# Patient Record
Sex: FEMALE | Race: WHITE | NOT HISPANIC OR LATINO | Employment: UNEMPLOYED | URBAN - METROPOLITAN AREA
[De-identification: names, ages, dates, MRNs, and addresses within clinical notes are randomized per-mention and may not be internally consistent; named-entity substitution may affect disease eponyms.]

---

## 2019-01-13 ENCOUNTER — HOSPITAL ENCOUNTER (EMERGENCY)
Age: 24
Discharge: HOME | End: 2019-01-13
Attending: EMERGENCY MEDICINE

## 2019-01-13 VITALS
RESPIRATION RATE: 16 BRPM | WEIGHT: 136.5 LBS | HEART RATE: 84 BPM | TEMPERATURE: 96.9 F | SYSTOLIC BLOOD PRESSURE: 87 MMHG | OXYGEN SATURATION: 98 % | DIASTOLIC BLOOD PRESSURE: 57 MMHG

## 2019-01-13 DIAGNOSIS — F10.929 ALCOHOLIC INTOXICATION WITH COMPLICATION (CMD): Primary | ICD-10-CM

## 2019-01-13 LAB
AMPHETAMINES UR QL: NEGATIVE
ANION GAP BLD CALC-SCNC: 19 MMOL/L
B-HCG SPEC QL: <5 IU/L
BARBITURATES UR QL: NEGATIVE
BENZODIAZ UR QL: NEGATIVE
BUN BLD-MCNC: 7 MG/DL (ref 6–20)
BZE UR QL: NEGATIVE
CA-I BLD ISE-SCNC: 1.02 MMOL/L (ref 1.15–1.29)
CANNABINOIDS UR QL SCN: NEGATIVE
CHLORIDE BLD-SCNC: 101 MMOL/L (ref 98–107)
CO2 BLD-SCNC: 25 MMOL/L (ref 19–24)
CREAT BLD-MCNC: 1 MG/DL (ref 0.51–0.95)
CREAT BLD-MCNC: >90 MG/DL
ETHANOL SERPL-MCNC: 278 MG/DL
GLUCOSE BLD-MCNC: 89 MG/DL (ref 65–99)
HCT VFR BLD CALC: 37 % (ref 36–46.5)
HGB BLD-MCNC: 12.6 G/DL (ref 12–15.5)
OPIATES UR QL: NEGATIVE
PCP UR QL: NEGATIVE
POTASSIUM BLD-SCNC: 3.4 MMOL/L (ref 3.4–5.1)
SODIUM BLD-SCNC: 141 MMOL/L (ref 135–145)

## 2019-01-13 PROCEDURE — 80307 DRUG TEST PRSMV CHEM ANLYZR: CPT

## 2019-01-13 PROCEDURE — 99283 EMERGENCY DEPT VISIT LOW MDM: CPT | Performed by: EMERGENCY MEDICINE

## 2019-01-13 PROCEDURE — 84702 CHORIONIC GONADOTROPIN TEST: CPT

## 2019-01-13 PROCEDURE — 36415 COLL VENOUS BLD VENIPUNCTURE: CPT

## 2019-01-13 PROCEDURE — 80320 DRUG SCREEN QUANTALCOHOLS: CPT

## 2019-01-13 PROCEDURE — 80047 BASIC METABLC PNL IONIZED CA: CPT

## 2019-01-13 PROCEDURE — 10002807 HB RX 258

## 2019-01-13 PROCEDURE — 99284 EMERGENCY DEPT VISIT MOD MDM: CPT

## 2019-01-13 RX ORDER — SODIUM CHLORIDE 9 MG/ML
INJECTION, SOLUTION INTRAVENOUS
Status: COMPLETED
Start: 2019-01-13 | End: 2019-01-13

## 2019-01-13 RX ADMIN — SODIUM CHLORIDE 1000 ML: 9 INJECTION, SOLUTION INTRAVENOUS at 04:23

## 2020-07-20 VITALS
TEMPERATURE: 97 F | RESPIRATION RATE: 16 BRPM | WEIGHT: 120.81 LBS | DIASTOLIC BLOOD PRESSURE: 61 MMHG | HEIGHT: 65 IN | OXYGEN SATURATION: 98 % | HEART RATE: 53 BPM | SYSTOLIC BLOOD PRESSURE: 99 MMHG

## 2020-07-20 NOTE — PRE-OP CHECKLIST - SELECT TESTS ORDERED
BMP/CBC/PT/PTT/INR EKG/Covid negative, 7/21/PT/PTT/BMP/CBC/INR EKG/Covid negative, 7/21, urine HCG negative/PT/PTT/BMP/INR/CBC

## 2020-07-23 ENCOUNTER — INPATIENT (INPATIENT)
Facility: HOSPITAL | Age: 25
LOS: 1 days | Discharge: HOME | DRG: 132 | End: 2020-07-25
Attending: SURGERY | Admitting: SURGERY
Payer: COMMERCIAL

## 2020-07-23 DIAGNOSIS — Z98.890 OTHER SPECIFIED POSTPROCEDURAL STATES: Chronic | ICD-10-CM

## 2020-07-23 LAB
BASE EXCESS BLDA CALC-SCNC: -2.1 MMOL/L — LOW (ref -2–3)
CA-I BLDA-SCNC: 1.06 MMOL/L — LOW (ref 1.12–1.3)
COHGB MFR BLDA: 0.3 % — SIGNIFICANT CHANGE UP
GLUCOSE BLDC GLUCOMTR-MCNC: 154 MG/DL — HIGH (ref 70–99)
HCO3 BLDA-SCNC: 22 MMOL/L — SIGNIFICANT CHANGE UP (ref 21–28)
HCT VFR BLD CALC: 30.7 % — LOW (ref 34.5–45)
HGB BLD-MCNC: 10.4 G/DL — LOW (ref 11.5–15.5)
HGB BLDA-MCNC: 10.9 G/DL — LOW (ref 11.5–15.5)
MCHC RBC-ENTMCNC: 31.6 PG — SIGNIFICANT CHANGE UP (ref 27–34)
MCHC RBC-ENTMCNC: 33.9 GM/DL — SIGNIFICANT CHANGE UP (ref 32–36)
MCV RBC AUTO: 93.3 FL — SIGNIFICANT CHANGE UP (ref 80–100)
METHGB MFR BLDA: 0.7 % — SIGNIFICANT CHANGE UP
NRBC # BLD: 0 /100 WBCS — SIGNIFICANT CHANGE UP (ref 0–0)
O2 CT VFR BLDA CALC: 14.6 ML/DL — SIGNIFICANT CHANGE UP (ref 15–23)
OXYHGB MFR BLDA: 94 % — SIGNIFICANT CHANGE UP (ref 94–100)
PCO2 BLDA: 36 MMHG — SIGNIFICANT CHANGE UP (ref 32–45)
PH BLDA: 7.41 — SIGNIFICANT CHANGE UP (ref 7.35–7.45)
PLATELET # BLD AUTO: 154 K/UL — SIGNIFICANT CHANGE UP (ref 150–400)
PO2 BLDA: 84 MMHG — SIGNIFICANT CHANGE UP (ref 83–108)
POTASSIUM BLDA-SCNC: 3.8 MMOL/L — SIGNIFICANT CHANGE UP (ref 3.5–4.9)
RBC # BLD: 3.29 M/UL — LOW (ref 3.8–5.2)
RBC # FLD: 12.5 % — SIGNIFICANT CHANGE UP (ref 10.3–14.5)
SAO2 % BLDA: 95 % — SIGNIFICANT CHANGE UP (ref 95–100)
SODIUM BLDA-SCNC: 138 MMOL/L — SIGNIFICANT CHANGE UP (ref 138–146)
WBC # BLD: 12.84 K/UL — HIGH (ref 3.8–10.5)
WBC # FLD AUTO: 12.84 K/UL — HIGH (ref 3.8–10.5)

## 2020-07-23 RX ORDER — ACETAMINOPHEN 500 MG
1000 TABLET ORAL EVERY 6 HOURS
Refills: 0 | Status: DISCONTINUED | OUTPATIENT
Start: 2020-07-23 | End: 2020-07-25

## 2020-07-23 RX ORDER — HYDROMORPHONE HYDROCHLORIDE 2 MG/ML
0.5 INJECTION INTRAMUSCULAR; INTRAVENOUS; SUBCUTANEOUS EVERY 4 HOURS
Refills: 0 | Status: DISCONTINUED | OUTPATIENT
Start: 2020-07-23 | End: 2020-07-24

## 2020-07-23 RX ORDER — DEXAMETHASONE 0.5 MG/5ML
10 ELIXIR ORAL EVERY 8 HOURS
Refills: 0 | Status: DISCONTINUED | OUTPATIENT
Start: 2020-07-23 | End: 2020-07-25

## 2020-07-23 RX ORDER — ONDANSETRON 8 MG/1
4 TABLET, FILM COATED ORAL EVERY 6 HOURS
Refills: 0 | Status: DISCONTINUED | OUTPATIENT
Start: 2020-07-23 | End: 2020-07-25

## 2020-07-23 RX ORDER — FLUOXETINE HCL 10 MG
30 CAPSULE ORAL DAILY
Refills: 0 | Status: DISCONTINUED | OUTPATIENT
Start: 2020-07-23 | End: 2020-07-25

## 2020-07-23 RX ORDER — CHLORHEXIDINE GLUCONATE 213 G/1000ML
15 SOLUTION TOPICAL
Refills: 0 | Status: DISCONTINUED | OUTPATIENT
Start: 2020-07-23 | End: 2020-07-25

## 2020-07-23 RX ORDER — HEPARIN SODIUM 5000 [USP'U]/ML
5000 INJECTION INTRAVENOUS; SUBCUTANEOUS EVERY 8 HOURS
Refills: 0 | Status: DISCONTINUED | OUTPATIENT
Start: 2020-07-24 | End: 2020-07-25

## 2020-07-23 RX ORDER — OXYCODONE HYDROCHLORIDE 5 MG/1
5 TABLET ORAL EVERY 4 HOURS
Refills: 0 | Status: DISCONTINUED | OUTPATIENT
Start: 2020-07-23 | End: 2020-07-25

## 2020-07-23 RX ORDER — SODIUM CHLORIDE 9 MG/ML
1000 INJECTION, SOLUTION INTRAVENOUS
Refills: 0 | Status: DISCONTINUED | OUTPATIENT
Start: 2020-07-23 | End: 2020-07-25

## 2020-07-23 RX ADMIN — Medication 102 MILLIGRAM(S): at 22:34

## 2020-07-23 RX ADMIN — HYDROMORPHONE HYDROCHLORIDE 0.5 MILLIGRAM(S): 2 INJECTION INTRAMUSCULAR; INTRAVENOUS; SUBCUTANEOUS at 23:32

## 2020-07-23 RX ADMIN — SODIUM CHLORIDE 50 MILLILITER(S): 9 INJECTION, SOLUTION INTRAVENOUS at 21:01

## 2020-07-23 NOTE — H&P ADULT - HISTORY OF PRESENT ILLNESS
24F pmhx depression on fluoxetine, s/p septoplasty 5 years ago. admitted today for lefort 1 osteosomy, genioplasty, bilateral saggital split osteotomy, L otoplasty.    Hospital Course:  07-23-20 @ 20:56    Vital Signs Last 24 Hrs  T(C): 37.7 (23 Jul 2020 20:30), Max: 37.7 (23 Jul 2020 20:30)  T(F): 99.8 (23 Jul 2020 20:30), Max: 99.8 (23 Jul 2020 20:30)  HR: --  BP: 105/51 (23 Jul 2020 20:30) (105/51 - 105/51)  BP(mean): --  RR: 19 (23 Jul 2020 20:30) (19 - 19)  SpO2: 90% (23 Jul 2020 20:30) (90% - 90%)    MEDICATIONS  (STANDING):  acetaminophen    Suspension .. 1000 milliGRAM(s) Oral every 6 hours  chlorhexidine 0.12% Liquid 15 milliLiter(s) Oral Mucosa two times a day  dexAMETHasone  IVPB 10 milliGRAM(s) IV Intermittent every 8 hours  FLUoxetine Solution 30 milliGRAM(s) Oral daily  lactated ringers. 1000 milliLiter(s) (50 mL/Hr) IV Continuous <Continuous>      General: AAOx3, resting in bed, comfortable  C/V: NSR RRR  Pulm: Nonlabored breathing, no respiratory distress  Extrem: WWP, no edema, SCDs in place  HEENT: intraoral incision c/d/i, no bleeding, no hematoma. mild perifacial swelling. head bra in place with gauze fluffs. rubber bands in place      A/P: 24F pmhx depression on fluoxetine, s/p septoplasty 5 years ago. admitted today for lefort 1 osteosomy, genioplasty, bilateral saggital split osteotomy, L otoplasty.  - admit to tele  - DVT ppx  - CLD and red rubber feeding tomorrow (SLP)  - pain control  - anti emetics PRN  - head bra  - ice pack  - peridex  - decadron 10q8  - stat CBC and labs tomorrow    ----------------------------------------------  Ramón Timur, MD  Resident  Department of Otolaryngology - Head and Neck Surgery  [facility]

## 2020-07-24 LAB
ANION GAP SERPL CALC-SCNC: 10 MMOL/L — SIGNIFICANT CHANGE UP (ref 5–17)
BASOPHILS # BLD AUTO: 0.01 K/UL — SIGNIFICANT CHANGE UP (ref 0–0.2)
BASOPHILS NFR BLD AUTO: 0.1 % — SIGNIFICANT CHANGE UP (ref 0–2)
BUN SERPL-MCNC: 15 MG/DL — SIGNIFICANT CHANGE UP (ref 7–23)
CALCIUM SERPL-MCNC: 8.8 MG/DL — SIGNIFICANT CHANGE UP (ref 8.4–10.5)
CHLORIDE SERPL-SCNC: 105 MMOL/L — SIGNIFICANT CHANGE UP (ref 96–108)
CO2 SERPL-SCNC: 26 MMOL/L — SIGNIFICANT CHANGE UP (ref 22–31)
CREAT SERPL-MCNC: 0.74 MG/DL — SIGNIFICANT CHANGE UP (ref 0.5–1.3)
EOSINOPHIL # BLD AUTO: 0 K/UL — SIGNIFICANT CHANGE UP (ref 0–0.5)
EOSINOPHIL NFR BLD AUTO: 0 % — SIGNIFICANT CHANGE UP (ref 0–6)
GLUCOSE SERPL-MCNC: 138 MG/DL — HIGH (ref 70–99)
HCT VFR BLD CALC: 28.4 % — LOW (ref 34.5–45)
HGB BLD-MCNC: 9.5 G/DL — LOW (ref 11.5–15.5)
IMM GRANULOCYTES NFR BLD AUTO: 0.5 % — SIGNIFICANT CHANGE UP (ref 0–1.5)
LYMPHOCYTES # BLD AUTO: 0.49 K/UL — LOW (ref 1–3.3)
LYMPHOCYTES # BLD AUTO: 3.9 % — LOW (ref 13–44)
MAGNESIUM SERPL-MCNC: 2 MG/DL — SIGNIFICANT CHANGE UP (ref 1.6–2.6)
MCHC RBC-ENTMCNC: 31.5 PG — SIGNIFICANT CHANGE UP (ref 27–34)
MCHC RBC-ENTMCNC: 33.5 GM/DL — SIGNIFICANT CHANGE UP (ref 32–36)
MCV RBC AUTO: 94 FL — SIGNIFICANT CHANGE UP (ref 80–100)
MONOCYTES # BLD AUTO: 0.64 K/UL — SIGNIFICANT CHANGE UP (ref 0–0.9)
MONOCYTES NFR BLD AUTO: 5.1 % — SIGNIFICANT CHANGE UP (ref 2–14)
NEUTROPHILS # BLD AUTO: 11.4 K/UL — HIGH (ref 1.8–7.4)
NEUTROPHILS NFR BLD AUTO: 90.4 % — HIGH (ref 43–77)
NRBC # BLD: 0 /100 WBCS — SIGNIFICANT CHANGE UP (ref 0–0)
PHOSPHATE SERPL-MCNC: 3.9 MG/DL — SIGNIFICANT CHANGE UP (ref 2.5–4.5)
PLATELET # BLD AUTO: 125 K/UL — LOW (ref 150–400)
POTASSIUM SERPL-MCNC: 4.3 MMOL/L — SIGNIFICANT CHANGE UP (ref 3.5–5.3)
POTASSIUM SERPL-SCNC: 4.3 MMOL/L — SIGNIFICANT CHANGE UP (ref 3.5–5.3)
RBC # BLD: 3.02 M/UL — LOW (ref 3.8–5.2)
RBC # FLD: 12.6 % — SIGNIFICANT CHANGE UP (ref 10.3–14.5)
SODIUM SERPL-SCNC: 141 MMOL/L — SIGNIFICANT CHANGE UP (ref 135–145)
WBC # BLD: 12.6 K/UL — HIGH (ref 3.8–10.5)
WBC # FLD AUTO: 12.6 K/UL — HIGH (ref 3.8–10.5)

## 2020-07-24 RX ORDER — OXYCODONE HYDROCHLORIDE 5 MG/1
5 TABLET ORAL
Qty: 100 | Refills: 0
Start: 2020-07-24 | End: 2020-07-28

## 2020-07-24 RX ORDER — PETROLATUM,WHITE
1 JELLY (GRAM) TOPICAL THREE TIMES A DAY
Refills: 0 | Status: DISCONTINUED | OUTPATIENT
Start: 2020-07-24 | End: 2020-07-25

## 2020-07-24 RX ORDER — AMOXICILLIN 250 MG/5ML
10 SUSPENSION, RECONSTITUTED, ORAL (ML) ORAL
Qty: 210 | Refills: 0
Start: 2020-07-24 | End: 2020-07-30

## 2020-07-24 RX ORDER — SODIUM CHLORIDE 0.65 %
2 AEROSOL, SPRAY (ML) NASAL
Qty: 1 | Refills: 0
Start: 2020-07-24 | End: 2020-07-30

## 2020-07-24 RX ORDER — AMOXICILLIN 250 MG/5ML
20 SUSPENSION, RECONSTITUTED, ORAL (ML) ORAL
Qty: 420 | Refills: 0
Start: 2020-07-24 | End: 2020-07-30

## 2020-07-24 RX ORDER — GUANFACINE 3 MG/1
1 TABLET, EXTENDED RELEASE ORAL
Qty: 7 | Refills: 0
Start: 2020-07-24 | End: 2020-07-30

## 2020-07-24 RX ORDER — CHLORHEXIDINE GLUCONATE 213 G/1000ML
15 SOLUTION TOPICAL
Qty: 315 | Refills: 0
Start: 2020-07-24 | End: 2020-07-30

## 2020-07-24 RX ADMIN — CHLORHEXIDINE GLUCONATE 15 MILLILITER(S): 213 SOLUTION TOPICAL at 19:21

## 2020-07-24 RX ADMIN — Medication 1000 MILLIGRAM(S): at 05:56

## 2020-07-24 RX ADMIN — HEPARIN SODIUM 5000 UNIT(S): 5000 INJECTION INTRAVENOUS; SUBCUTANEOUS at 05:56

## 2020-07-24 RX ADMIN — HEPARIN SODIUM 5000 UNIT(S): 5000 INJECTION INTRAVENOUS; SUBCUTANEOUS at 15:49

## 2020-07-24 RX ADMIN — Medication 1 APPLICATION(S): at 15:51

## 2020-07-24 RX ADMIN — Medication 102 MILLIGRAM(S): at 15:49

## 2020-07-24 RX ADMIN — CHLORHEXIDINE GLUCONATE 15 MILLILITER(S): 213 SOLUTION TOPICAL at 05:56

## 2020-07-24 RX ADMIN — Medication 102 MILLIGRAM(S): at 22:30

## 2020-07-24 RX ADMIN — OXYCODONE HYDROCHLORIDE 5 MILLIGRAM(S): 5 TABLET ORAL at 21:13

## 2020-07-24 RX ADMIN — HYDROMORPHONE HYDROCHLORIDE 0.5 MILLIGRAM(S): 2 INJECTION INTRAMUSCULAR; INTRAVENOUS; SUBCUTANEOUS at 03:33

## 2020-07-24 RX ADMIN — HYDROMORPHONE HYDROCHLORIDE 0.5 MILLIGRAM(S): 2 INJECTION INTRAMUSCULAR; INTRAVENOUS; SUBCUTANEOUS at 14:36

## 2020-07-24 RX ADMIN — Medication 1 APPLICATION(S): at 22:30

## 2020-07-24 RX ADMIN — Medication 1000 MILLIGRAM(S): at 19:21

## 2020-07-24 RX ADMIN — HYDROMORPHONE HYDROCHLORIDE 0.5 MILLIGRAM(S): 2 INJECTION INTRAMUSCULAR; INTRAVENOUS; SUBCUTANEOUS at 10:23

## 2020-07-24 RX ADMIN — Medication 102 MILLIGRAM(S): at 06:12

## 2020-07-24 RX ADMIN — Medication 30 MILLIGRAM(S): at 12:04

## 2020-07-24 RX ADMIN — HEPARIN SODIUM 5000 UNIT(S): 5000 INJECTION INTRAVENOUS; SUBCUTANEOUS at 22:30

## 2020-07-24 RX ADMIN — HYDROMORPHONE HYDROCHLORIDE 0.5 MILLIGRAM(S): 2 INJECTION INTRAMUSCULAR; INTRAVENOUS; SUBCUTANEOUS at 00:30

## 2020-07-24 RX ADMIN — HYDROMORPHONE HYDROCHLORIDE 0.5 MILLIGRAM(S): 2 INJECTION INTRAMUSCULAR; INTRAVENOUS; SUBCUTANEOUS at 05:00

## 2020-07-24 RX ADMIN — SODIUM CHLORIDE 50 MILLILITER(S): 9 INJECTION, SOLUTION INTRAVENOUS at 05:56

## 2020-07-24 NOTE — SWALLOW BEDSIDE ASSESSMENT ADULT - ORAL PHASE
Reduced labial seal with mild anterior spillage.  Pt also had some difficulty with placement of the feeding device 2/2 reduced labial sensation.

## 2020-07-24 NOTE — PROGRESS NOTE ADULT - SUBJECTIVE AND OBJECTIVE BOX
24F pmhx depression on fluoxetine, s/p septoplasty 5 years ago. admitted today for lefort 1 osteosomy, genioplasty, bilateral saggital split osteotomy, L otoplasty.    07-24 Patient seen and discussed with attending.     ALLERGIES  No Known Drug Allergies  Peaches (Unknown)  Pears (Unknown)    MEDICATIONS  (STANDING):  acetaminophen    Suspension .. 1000 milliGRAM(s) Oral every 6 hours  chlorhexidine 0.12% Liquid 15 milliLiter(s) Oral Mucosa two times a day  dexAMETHasone  IVPB 10 milliGRAM(s) IV Intermittent every 8 hours  FLUoxetine Solution 30 milliGRAM(s) Oral daily  heparin   Injectable 5000 Unit(s) SubCutaneous every 8 hours  lactated ringers. 1000 milliLiter(s) (50 mL/Hr) IV Continuous <Continuous>  petrolatum white Ointment 1 Application(s) Topical three times a day    MEDICATIONS  (PRN):  HYDROmorphone  Injectable 0.5 milliGRAM(s) IV Push every 4 hours PRN Severe Pain (7 - 10)  ondansetron Injectable 4 milliGRAM(s) IV Push every 6 hours PRN Nausea  oxyCODONE    Solution 5 milliGRAM(s) Oral every 4 hours PRN Mild Pain (1 - 3)      LABS                          9.5    12.60 )-----------( 125      ( 24 Jul 2020 06:38 )             28.4     07-24    141  |  105  |  15  ----------------------------<  138<H>  4.3   |  26  |  0.74    Ca    8.8      24 Jul 2020 06:38  Phos  3.9     07-24  Mg     2.0     07-24            INs & OUTs    07-23-20 @ 07:01  -  07-24-20 @ 07:00  --------------------------------------------------------  IN: 250 mL / OUT: 800 mL / NET: -550 mL    07-24-20 @ 07:01  - 07-24-20 @ 17:31  --------------------------------------------------------  IN: 0 mL / OUT: 100 mL / NET: -100 mL        RADIOLOGY AND IMAGING: reviewed    VITAL SIGNS:  ICU Vital Signs Last 24 Hrs  T(C): 36.3 (24 Jul 2020 13:40), Max: 37.7 (23 Jul 2020 20:30)  T(F): 97.3 (24 Jul 2020 13:40), Max: 99.8 (23 Jul 2020 20:30)  HR: 68 (24 Jul 2020 15:59) (50 - 68)  BP: 97/55 (24 Jul 2020 15:59) (94/50 - 105/56)  BP(mean): 70 (24 Jul 2020 15:59) (67 - 74)  ABP: --  ABP(mean): --  RR: 18 (24 Jul 2020 15:59) (12 - 19)  SpO2: 96% (24 Jul 2020 15:59) (90% - 97%)    PHYSICAL EXAM:  AAOx3, resting in bed, comfortable  C/V: NSR RRR  Pulm: Nonlabored breathing, no respiratory distress  Extrem: WWP, no edema, SCDs in place  HEENT: intraoral incision c/d/i, no bleeding, no hematoma. mild perifacial swelling. head bra in place with gauze fluffs. rubber bands in place      A/P: 24F pmhx depression on fluoxetine, s/p septoplasty 5 years ago. admitted today for lefort 1 osteosomy, genioplasty, bilateral saggital split osteotomy, L otoplasty.  - admit to tele  - DVT ppx  - CLD and red rubber feeding tomorrow (SLP)  - pain control  - anti emetics PRN  - head bra 24F pmhx depression on fluoxetine, s/p septoplasty 5 years ago. admitted today for lefort 1 osteosomy, genioplasty, bilateral saggital split osteotomy, L otoplasty.    07-24 Patient seen and discussed with attending. patient was straight cathed at 4AM due to bladder retention but otherwise did well. She passed her TOV this afternoon. she is tolerating PO with the nutri bottle, will continue to monitor.     ALLERGIES  No Known Drug Allergies  Peaches (Unknown)  Pears (Unknown)    MEDICATIONS  (STANDING):  acetaminophen    Suspension .. 1000 milliGRAM(s) Oral every 6 hours  chlorhexidine 0.12% Liquid 15 milliLiter(s) Oral Mucosa two times a day  dexAMETHasone  IVPB 10 milliGRAM(s) IV Intermittent every 8 hours  FLUoxetine Solution 30 milliGRAM(s) Oral daily  heparin   Injectable 5000 Unit(s) SubCutaneous every 8 hours  lactated ringers. 1000 milliLiter(s) (50 mL/Hr) IV Continuous <Continuous>  petrolatum white Ointment 1 Application(s) Topical three times a day    MEDICATIONS  (PRN):  HYDROmorphone  Injectable 0.5 milliGRAM(s) IV Push every 4 hours PRN Severe Pain (7 - 10)  ondansetron Injectable 4 milliGRAM(s) IV Push every 6 hours PRN Nausea  oxyCODONE    Solution 5 milliGRAM(s) Oral every 4 hours PRN Mild Pain (1 - 3)      LABS                          9.5    12.60 )-----------( 125      ( 24 Jul 2020 06:38 )             28.4     07-24    141  |  105  |  15  ----------------------------<  138<H>  4.3   |  26  |  0.74    Ca    8.8      24 Jul 2020 06:38  Phos  3.9     07-24  Mg     2.0     07-24            INs & OUTs    07-23-20 @ 07:01  -  07-24-20 @ 07:00  --------------------------------------------------------  IN: 250 mL / OUT: 800 mL / NET: -550 mL    07-24-20 @ 07:01  -  07-24-20 @ 17:31  --------------------------------------------------------  IN: 0 mL / OUT: 100 mL / NET: -100 mL        RADIOLOGY AND IMAGING: reviewed    VITAL SIGNS:  ICU Vital Signs Last 24 Hrs  T(C): 36.3 (24 Jul 2020 13:40), Max: 37.7 (23 Jul 2020 20:30)  T(F): 97.3 (24 Jul 2020 13:40), Max: 99.8 (23 Jul 2020 20:30)  HR: 68 (24 Jul 2020 15:59) (50 - 68)  BP: 97/55 (24 Jul 2020 15:59) (94/50 - 105/56)  BP(mean): 70 (24 Jul 2020 15:59) (67 - 74)  ABP: --  ABP(mean): --  RR: 18 (24 Jul 2020 15:59) (12 - 19)  SpO2: 96% (24 Jul 2020 15:59) (90% - 97%)    PHYSICAL EXAM:  AAOx3, resting in bed, comfortable  C/V: NSR RRR  Pulm: Nonlabored breathing, no respiratory distress  Extrem: WWP, no edema, SCDs in place  HEENT: intraoral incision c/d/i, no bleeding, no hematoma. mild perifacial swelling. head bra in place with gauze fluffs. rubber bands in place      A/P: 24F pmhx depression on fluoxetine, s/p septoplasty 5 years ago. admitted today for lefort 1 osteosomy, genioplasty, bilateral saggital split osteotomy, L otoplasty.  - DVT ppx  - CLD and nutri-squeeze bottle, will monitor PO intake  - pain control  - anti emetics PRN  - head bra

## 2020-07-24 NOTE — SWALLOW BEDSIDE ASSESSMENT ADULT - SLP GENERAL OBSERVATIONS
Pt received awake and alert, pleasant, in NAD, but reporting some pain in the lower face and mouth region s/p surgery yesterday.  She reported that she was able to tolerate small amounts of water this AM using a syringe with a red catheter attachment.

## 2020-07-24 NOTE — SWALLOW BEDSIDE ASSESSMENT ADULT - SPECIFY REASON(S)
24 F s/p Lefort 1 osteosomy, genioplasty, b/l sagittal split osteotomy, L optoplasty 7/23/20, currently with mouth wired shut.

## 2020-07-24 NOTE — SWALLOW BEDSIDE ASSESSMENT ADULT - SWALLOW EVAL: DIAGNOSIS
Pt with primarily oral stage dysphagia 2/2 mouth being wired shut s/p Lefort 1 osteosomy & related procedures yesterday.  She was able to swallow thin liquids using a Nutri-Squeeze bottle without overt signs & symptoms of airway protection deficits.

## 2020-07-24 NOTE — SWALLOW BEDSIDE ASSESSMENT ADULT - SWALLOW EVAL: ORAL MUSCULATURE
Unable to fully assess given restricted mouth opening.  Pt reported reduced labial sensation b/l.  Labial ROM also appeared reduced,

## 2020-07-25 VITALS
OXYGEN SATURATION: 93 % | HEART RATE: 84 BPM | RESPIRATION RATE: 18 BRPM | SYSTOLIC BLOOD PRESSURE: 99 MMHG | DIASTOLIC BLOOD PRESSURE: 64 MMHG

## 2020-07-25 RX ADMIN — OXYCODONE HYDROCHLORIDE 5 MILLIGRAM(S): 5 TABLET ORAL at 01:15

## 2020-07-25 RX ADMIN — CHLORHEXIDINE GLUCONATE 15 MILLILITER(S): 213 SOLUTION TOPICAL at 05:54

## 2020-07-25 RX ADMIN — Medication 102 MILLIGRAM(S): at 13:00

## 2020-07-25 RX ADMIN — Medication 102 MILLIGRAM(S): at 05:54

## 2020-07-25 RX ADMIN — OXYCODONE HYDROCHLORIDE 5 MILLIGRAM(S): 5 TABLET ORAL at 11:41

## 2020-07-25 RX ADMIN — Medication 1 APPLICATION(S): at 05:54

## 2020-07-25 RX ADMIN — HEPARIN SODIUM 5000 UNIT(S): 5000 INJECTION INTRAVENOUS; SUBCUTANEOUS at 05:54

## 2020-07-25 RX ADMIN — Medication 30 MILLIGRAM(S): at 11:41

## 2020-07-25 RX ADMIN — OXYCODONE HYDROCHLORIDE 5 MILLIGRAM(S): 5 TABLET ORAL at 07:15

## 2020-07-25 NOTE — DISCHARGE NOTE PROVIDER - NSDCHC_MEDRECSTATUS_GEN_ALL_CORE
Admission Reconciliation is Not Complete  Discharge Reconciliation is Completed
Admission Reconciliation is Not Complete  Discharge Reconciliation is Completed

## 2020-07-25 NOTE — DISCHARGE NOTE NURSING/CASE MANAGEMENT/SOCIAL WORK - PATIENT PORTAL LINK FT
You can access the FollowMyHealth Patient Portal offered by [facility] by registering at the following website: [email or web address]. By joining [facility]’s FollowMyHealth portal, you will also be able to view your health information using other applications (apps) compatible with our system.

## 2020-07-25 NOTE — DISCHARGE NOTE PROVIDER - CARE PROVIDER_API CALL
Say Ramachandran  ORAL/MAXILLOFACIAL SURGERY  [address], [address]  [place] [address]  Phone: [phone]  Fax: [phone]  Follow Up Time:

## 2020-07-25 NOTE — DISCHARGE NOTE PROVIDER - NSDCCPCAREPLAN_GEN_ALL_CORE_FT
PRINCIPAL DISCHARGE DIAGNOSIS  Diagnosis: Post-operative state  Assessment and Plan of Treatment:       SECONDARY DISCHARGE DIAGNOSES  Diagnosis: Face pain  Assessment and Plan of Treatment:

## 2020-07-25 NOTE — DISCHARGE NOTE PROVIDER - NSDCFUADDINST_GEN_ALL_CORE_FT
Activity: No heavy lifting or strenuous activity. Walk as tolerated.     Wound Care: You may shower. No baths, hot tubs or swimming until approved by your surgeon. Apply vaseline to your lips 3 times a day. Use ice packs to both sides of your face every 2 hours for 20 minutes.     Follow up with Dr. Ramachandran on Monday. Please bring rubber bands to appointment.     Call office for fever >101.5 or redness or drainage from wound.

## 2020-07-25 NOTE — DISCHARGE NOTE PROVIDER - HOSPITAL COURSE
24F pmhx depression on fluoxetine, s/p septoplasty 5 years ago. admitted today for lefort 1 osteosomy, genioplasty, bilateral saggital split osteotomy, L otoplasty.        07-24 Patient seen and discussed with attending. patient was straight cathed at 4AM due to bladder retention but otherwise did well. She passed her TOV this afternoon. she is tolerating PO with the nutri bottle, will continue to monitor.     7-25 Patient did well overnight. No complaints. Urinating. Ambulating often. Tolerating PO. Ready for discharge.        Patient was admitted after her surgery for monitoring. Her course was uncomplicated and she did well. She is ready for discharge.

## 2020-07-25 NOTE — DISCHARGE NOTE PROVIDER - NSDCMRMEDTOKEN_GEN_ALL_CORE_FT
amoxicillin 250 mg/5 mL oral suspension: 10 milliliter(s) orally every 8 hours   FLUoxetine: 30 milligram(s) orally once a day (at bedtime)  guanFACINE 1 mg oral tablet: 1 tab(s) orally once a day (at bedtime)   Medrol Dosepak 4 mg oral tablet: Please take tablets as described on the package.   Ocean 0.65% nasal spray: 2 spray(s) intranasally 2 times a day   oxyCODONE 5 mg/5 mL oral solution: 5 milliliter(s) orally every 6 hours, As Needed MDD:4 doses   Peridex 0.12% mucous membrane liquid: 15 milliliter(s) orally 3 times a day
amoxicillin 250 mg/5 mL oral suspension: 10 milliliter(s) orally every 8 hours   FLUoxetine: 30 milligram(s) orally once a day (at bedtime)  guanFACINE 1 mg oral tablet: 1 tab(s) orally once a day (at bedtime)   Medrol Dosepak 4 mg oral tablet: Please take tablets as described on the package.   Ocean 0.65% nasal spray: 2 spray(s) intranasally 2 times a day   oxyCODONE 5 mg/5 mL oral solution: 5 milliliter(s) orally every 6 hours, As Needed MDD:4 doses   Peridex 0.12% mucous membrane liquid: 15 milliliter(s) orally 3 times a day

## 2020-07-29 PROCEDURE — 82962 GLUCOSE BLOOD TEST: CPT

## 2020-07-29 PROCEDURE — P9045: CPT

## 2020-07-29 PROCEDURE — 92610 EVALUATE SWALLOWING FUNCTION: CPT

## 2020-07-29 PROCEDURE — 84295 ASSAY OF SERUM SODIUM: CPT

## 2020-07-29 PROCEDURE — 36415 COLL VENOUS BLD VENIPUNCTURE: CPT

## 2020-07-29 PROCEDURE — 82330 ASSAY OF CALCIUM: CPT

## 2020-07-29 PROCEDURE — 85018 HEMOGLOBIN: CPT

## 2020-07-29 PROCEDURE — C1889: CPT

## 2020-07-29 PROCEDURE — 85025 COMPLETE CBC W/AUTO DIFF WBC: CPT

## 2020-07-29 PROCEDURE — 80048 BASIC METABOLIC PNL TOTAL CA: CPT

## 2020-07-29 PROCEDURE — C1713: CPT

## 2020-07-29 PROCEDURE — 84100 ASSAY OF PHOSPHORUS: CPT

## 2020-07-29 PROCEDURE — 86901 BLOOD TYPING SEROLOGIC RH(D): CPT

## 2020-07-29 PROCEDURE — 84132 ASSAY OF SERUM POTASSIUM: CPT

## 2020-07-29 PROCEDURE — 86850 RBC ANTIBODY SCREEN: CPT

## 2020-07-29 PROCEDURE — 85027 COMPLETE CBC AUTOMATED: CPT

## 2020-07-29 PROCEDURE — 83735 ASSAY OF MAGNESIUM: CPT

## 2020-07-30 DIAGNOSIS — R33.9 RETENTION OF URINE, UNSPECIFIED: ICD-10-CM

## 2020-07-30 DIAGNOSIS — M26.213 MALOCCLUSION, ANGLE'S CLASS III: ICD-10-CM

## 2020-07-30 DIAGNOSIS — F32.9 MAJOR DEPRESSIVE DISORDER, SINGLE EPISODE, UNSPECIFIED: ICD-10-CM

## 2021-09-22 NOTE — SWALLOW BEDSIDE ASSESSMENT ADULT - SLP PATIENT PROFILE REVIEW
yes Bexarotene Pregnancy And Lactation Text: This medication is Pregnancy Category X and should not be given to women who are pregnant or may become pregnant. This medication should not be used if you are breast feeding.

## 2025-03-17 NOTE — PATIENT PROFILE ADULT - BRADEN MOBILITY
Chacha Garcia mother accompanied Chacha Garcia to the emergency department on 3/17/2025. They may return to work on 03/20/2025.      If you have any questions or concerns, please don't hesitate to call.      Tawnya Brown PA-C
Chacha Garcia was seen and treated in our emergency department on 3/17/2025.  She may return to school on 03/20/2025.      If you have any questions or concerns, please don't hesitate to call.      Tawnya Brown PA-C
(4) no limitation